# Patient Record
Sex: FEMALE | Race: WHITE | NOT HISPANIC OR LATINO | Employment: FULL TIME | URBAN - METROPOLITAN AREA
[De-identification: names, ages, dates, MRNs, and addresses within clinical notes are randomized per-mention and may not be internally consistent; named-entity substitution may affect disease eponyms.]

---

## 2023-03-31 PROBLEM — K90.0 CELIAC DISEASE (HHS-HCC): Status: ACTIVE | Noted: 2023-03-31

## 2023-03-31 PROBLEM — F41.9 ANXIETY: Status: ACTIVE | Noted: 2023-03-31

## 2023-03-31 PROBLEM — L70.9 ACNE: Status: ACTIVE | Noted: 2023-03-31

## 2023-03-31 PROBLEM — D64.9 ANEMIA: Status: ACTIVE | Noted: 2023-03-31

## 2023-03-31 PROBLEM — R53.83 OTHER FATIGUE: Status: ACTIVE | Noted: 2023-03-31

## 2023-03-31 PROBLEM — K90.41 GLUTEN-SENSITIVE ENTEROPATHY: Status: ACTIVE | Noted: 2023-03-31

## 2023-03-31 PROBLEM — E03.9 ACQUIRED HYPOTHYROIDISM: Status: ACTIVE | Noted: 2023-03-31

## 2023-03-31 PROBLEM — B37.31 VAGINAL YEAST INFECTION: Status: ACTIVE | Noted: 2023-03-31

## 2023-03-31 PROBLEM — R51.9 HEAD ACHE: Status: ACTIVE | Noted: 2023-03-31

## 2023-03-31 PROBLEM — L80 VITILIGO: Status: ACTIVE | Noted: 2023-03-31

## 2023-03-31 PROBLEM — R73.02 IGT (IMPAIRED GLUCOSE TOLERANCE): Status: ACTIVE | Noted: 2023-03-31

## 2023-03-31 PROBLEM — L40.9 PSORIASIS: Status: ACTIVE | Noted: 2023-03-31

## 2023-03-31 PROBLEM — M79.676 TOE PAIN: Status: ACTIVE | Noted: 2023-03-31

## 2023-03-31 RX ORDER — SERTRALINE HYDROCHLORIDE 50 MG/1
1 TABLET, FILM COATED ORAL DAILY
COMMUNITY
Start: 2021-11-11 | End: 2023-08-10 | Stop reason: SDUPTHER

## 2023-03-31 RX ORDER — THYROID 90 MG/1
TABLET ORAL
COMMUNITY
Start: 2022-06-09 | End: 2023-04-13

## 2023-03-31 RX ORDER — THYROID, PORCINE 15 MG/1
1 TABLET ORAL DAILY
COMMUNITY
Start: 2022-03-09 | End: 2023-04-13

## 2023-03-31 RX ORDER — SPIRONOLACTONE 25 MG/1
1 TABLET ORAL 2 TIMES DAILY
COMMUNITY
Start: 2020-06-19

## 2023-03-31 RX ORDER — FLUCONAZOLE 150 MG/1
TABLET ORAL
COMMUNITY
Start: 2020-10-01 | End: 2024-05-01 | Stop reason: SDUPTHER

## 2023-03-31 RX ORDER — THYROID 60 MG/1
1 TABLET ORAL DAILY
COMMUNITY
Start: 2019-08-02 | End: 2023-04-13

## 2023-04-03 ENCOUNTER — OFFICE VISIT (OUTPATIENT)
Dept: PRIMARY CARE | Facility: CLINIC | Age: 38
End: 2023-04-03
Payer: COMMERCIAL

## 2023-04-03 VITALS
OXYGEN SATURATION: 98 % | DIASTOLIC BLOOD PRESSURE: 68 MMHG | BODY MASS INDEX: 30.24 KG/M2 | SYSTOLIC BLOOD PRESSURE: 116 MMHG | HEART RATE: 77 BPM | WEIGHT: 216 LBS | HEIGHT: 71 IN | TEMPERATURE: 97.5 F

## 2023-04-03 DIAGNOSIS — E04.9 GOITER: ICD-10-CM

## 2023-04-03 DIAGNOSIS — E03.9 ACQUIRED HYPOTHYROIDISM: Primary | ICD-10-CM

## 2023-04-03 PROBLEM — B37.31 VAGINAL YEAST INFECTION: Status: RESOLVED | Noted: 2023-03-31 | Resolved: 2023-04-03

## 2023-04-03 PROBLEM — R51.9 HEAD ACHE: Status: RESOLVED | Noted: 2023-03-31 | Resolved: 2023-04-03

## 2023-04-03 PROBLEM — K90.41 GLUTEN-SENSITIVE ENTEROPATHY: Status: RESOLVED | Noted: 2023-03-31 | Resolved: 2023-04-03

## 2023-04-03 PROBLEM — M79.676 TOE PAIN: Status: RESOLVED | Noted: 2023-03-31 | Resolved: 2023-04-03

## 2023-04-03 PROCEDURE — 99213 OFFICE O/P EST LOW 20 MIN: CPT | Performed by: INTERNAL MEDICINE

## 2023-04-03 RX ORDER — MULTIVITAMIN
1 TABLET ORAL DAILY
COMMUNITY

## 2023-04-03 RX ORDER — CETIRIZINE HYDROCHLORIDE 10 MG/1
10 TABLET ORAL DAILY
COMMUNITY

## 2023-04-04 NOTE — PROGRESS NOTES
"Subjective   Patient ID: Kendal Heredia is a 37 y.o. female who presents for Follow-up.    HPI hypothyroidism. Would like labs due to fatigue and weight gain    Review of Systems    Objective   /68   Pulse 77   Temp 36.4 °C (97.5 °F)   Ht 1.803 m (5' 11\")   Wt 98 kg (216 lb)   SpO2 98%   BMI 30.13 kg/m²     Physical Exam  Gen nad, affect wnl  Heentt eomfg, face symmetric, ncat  Neck w/o la, tm, bruit  Lungs clear   Cv rrr nl s1, s2  Ext w/o edema  Neuro grossly nonfocal  Skin good color    Assessment/Plan   Diagnoses and all orders for this visit:  Acquired hypothyroidism  -     TSH; Future  -     T4, free; Future  -     T3; Future  Goiter  -     US thyroid; Future       "

## 2023-04-06 ENCOUNTER — LAB (OUTPATIENT)
Dept: LAB | Facility: LAB | Age: 38
End: 2023-04-06
Payer: COMMERCIAL

## 2023-04-06 DIAGNOSIS — E03.9 ACQUIRED HYPOTHYROIDISM: ICD-10-CM

## 2023-04-06 LAB
THYROTROPIN (MIU/L) IN SER/PLAS BY DETECTION LIMIT <= 0.05 MIU/L: 3.75 MIU/L (ref 0.44–3.98)
THYROXINE (T4) FREE (NG/DL) IN SER/PLAS: 0.74 NG/DL (ref 0.78–1.48)
TRIIODOTHYRONINE (T3) (NG/DL) IN SER/PLAS: 121 NG/DL (ref 60–200)

## 2023-04-06 PROCEDURE — 84443 ASSAY THYROID STIM HORMONE: CPT

## 2023-04-06 PROCEDURE — 84480 ASSAY TRIIODOTHYRONINE (T3): CPT

## 2023-04-06 PROCEDURE — 36415 COLL VENOUS BLD VENIPUNCTURE: CPT

## 2023-04-06 PROCEDURE — 84439 ASSAY OF FREE THYROXINE: CPT

## 2023-04-13 DIAGNOSIS — E03.9 ACQUIRED HYPOTHYROIDISM: Primary | ICD-10-CM

## 2023-04-13 RX ORDER — THYROID 90 MG/1
90 TABLET ORAL
Qty: 30 TABLET | Refills: 11 | Status: SHIPPED | OUTPATIENT
Start: 2023-04-13 | End: 2023-06-15 | Stop reason: SDUPTHER

## 2023-06-15 DIAGNOSIS — E03.9 ACQUIRED HYPOTHYROIDISM: ICD-10-CM

## 2023-06-15 RX ORDER — THYROID 90 MG/1
90 TABLET ORAL
Qty: 90 TABLET | Refills: 3 | Status: SHIPPED | OUTPATIENT
Start: 2023-06-15 | End: 2024-06-09

## 2023-08-10 DIAGNOSIS — F41.9 ANXIETY: Primary | ICD-10-CM

## 2023-08-10 RX ORDER — SERTRALINE HYDROCHLORIDE 50 MG/1
50 TABLET, FILM COATED ORAL DAILY
Qty: 90 TABLET | Refills: 3 | Status: SHIPPED | OUTPATIENT
Start: 2023-08-10

## 2023-09-08 ENCOUNTER — LAB (OUTPATIENT)
Dept: LAB | Facility: LAB | Age: 38
End: 2023-09-08
Payer: COMMERCIAL

## 2023-09-08 DIAGNOSIS — E03.9 ACQUIRED HYPOTHYROIDISM: ICD-10-CM

## 2023-09-08 LAB — THYROTROPIN (MIU/L) IN SER/PLAS BY DETECTION LIMIT <= 0.05 MIU/L: 1.03 MIU/L (ref 0.44–3.98)

## 2023-09-08 PROCEDURE — 84443 ASSAY THYROID STIM HORMONE: CPT

## 2023-09-08 PROCEDURE — 36415 COLL VENOUS BLD VENIPUNCTURE: CPT

## 2024-03-05 ENCOUNTER — OFFICE VISIT (OUTPATIENT)
Dept: GASTROENTEROLOGY | Facility: CLINIC | Age: 39
End: 2024-03-05
Payer: COMMERCIAL

## 2024-03-05 VITALS — HEIGHT: 72 IN | BODY MASS INDEX: 28.71 KG/M2 | WEIGHT: 212 LBS | HEART RATE: 89 BPM | OXYGEN SATURATION: 97 %

## 2024-03-05 DIAGNOSIS — K90.0 CELIAC DISEASE (HHS-HCC): ICD-10-CM

## 2024-03-05 DIAGNOSIS — R19.8 IRREGULAR BOWEL HABITS: ICD-10-CM

## 2024-03-05 DIAGNOSIS — R10.9 ABDOMINAL PAIN, UNSPECIFIED ABDOMINAL LOCATION: Primary | ICD-10-CM

## 2024-03-05 PROCEDURE — 99204 OFFICE O/P NEW MOD 45 MIN: CPT | Performed by: INTERNAL MEDICINE

## 2024-03-05 RX ORDER — RUXOLITINIB 15 MG/G
1 CREAM TOPICAL 2 TIMES DAILY
COMMUNITY
Start: 2023-11-30

## 2024-03-05 RX ORDER — OMEPRAZOLE 40 MG/1
CAPSULE, DELAYED RELEASE ORAL
Qty: 90 CAPSULE | Refills: 3 | Status: SHIPPED | OUTPATIENT
Start: 2024-03-05

## 2024-03-05 NOTE — PROGRESS NOTES
Subjective     History of Present Illness:     37 yo with hypothyroidism, IBS, celiac disease here for evaluation of epigastric discomfort, indigestion, lower abdominal discomfort.  Last seen in 2019 at that time with irregular bowel movements, diagnosed with celiac disease with elevated gliaden ab and bx showing villous atrophy.  Colonoscopy 7/2018 internal hemorrhoids, normal biopsies. Improvement in symptoms on gluten free diet.     Intestinal issues over the last 9 months.  For several months the last year, general discomfort, epigastric - pain indigestion, bloating.  Also with lower abdominal stabbing pains.Upper abd pain very consistent. During that time also diarrhea, urgency .  In Jan tried eliminating certain things in diet- eliminated dairy, carbonated foods. Nothing felt related.  No new medications other than topical.  Less of upper pain, but now more diarrhea - 10 days of diarrhea no matter what you eat. Then no diarrhea for 10 days. Then normal which ranges from loose to formed.  + nocturnal bowel movements, but no ot ofen.   Did have heartburn previously took otc prilosec at night.    No weight loss.      Egd 2018- gastritis, normal duodenum bx c/w celiac  Colonoscopy 2018- normal to Ti, random colon bipsies normal; internal hemorrhoids      Past Medical History  Past Medical History:   Diagnosis Date    Abdominal distension (gaseous) 06/05/2018    Abdominal bloating    Acute upper respiratory infection, unspecified 10/06/2015    Acute upper respiratory infection    Chronic rhinitis     Rhinitis    Gluten-sensitive enteropathy 03/31/2023    Insect bite (nonvenomous), left knee, initial encounter (CODE) 08/24/2017    Tick bite of knee, left, initial encounter    Other fecal abnormalities 05/07/2018    Guaiac + stool    Other gastritis without bleeding 07/20/2018    Erosive gastritis    Other specified disorders of nose and nasal sinuses 10/04/2019    Nasal pain    Personal history of other diseases of  the nervous system and sense organs     History of migraine    Personal history of other diseases of the respiratory system 02/24/2015    History of acute sinusitis    Personal history of other diseases of the respiratory system 10/06/2015    History of acute pharyngitis    Personal history of other diseases of the respiratory system 10/06/2015    History of acute bronchitis    Personal history of other specified conditions 06/28/2019    History of abdominal pain       Social History  Social History     Tobacco Use    Smoking status: Never   Substance Use Topics    Alcohol use: Yes     Comment: 1-2 DAYS PER WEEK    Drug use: Never       Family History  Family History   Problem Relation Name Age of Onset    Ovarian cancer Mother      Osteoporosis Mother      Hypertension Father          Allergies  Allergies   Allergen Reactions    Doxycycline Unknown    Oxycodone Unknown    Penicillins Unknown       Medications  Current Outpatient Medications   Medication Instructions    cetirizine (ZYRTEC) 10 mg, oral, Daily    fluconazole (Diflucan) 150 mg tablet oral    multivitamin tablet 1 tablet, oral, Daily    Opzelura 1.5 % cream cream 1 Application, Topical, 2 times daily    sertraline (ZOLOFT) 50 mg, oral, Daily    spironolactone (Aldactone) 25 mg tablet 1 tablet, oral, 2 times daily    thyroid (pork) (ARMOUR) 90 mg, oral, Daily before breakfast        Objective   Visit Vitals  Pulse 89      Physical Exam  Vitals reviewed.   Constitutional:       General: She is awake.   Cardiovascular:      Rate and Rhythm: Normal rate and regular rhythm.   Pulmonary:      Effort: Pulmonary effort is normal.      Breath sounds: Normal breath sounds.   Abdominal:      General: Bowel sounds are normal.      Palpations: Abdomen is soft.      Tenderness: There is no abdominal tenderness.   Neurological:      Mental Status: She is alert and oriented to person, place, and time.   Psychiatric:         Attention and Perception: Attention and  perception normal.         Behavior: Behavior normal.           Assessment/Plan         37 yo with hypothyroidism, IBS, celiac disease here for evaluation of epigastrid discomfort, indigestion, lower abdominal discomfort in the setting of irregular bowel habits that appear to be more constipation with overflow diarrhea.  Upper abdominal symptoms consistent with esophageal reflux, possibly erosive disease. Will check stool studies and fecal calprotectin for diarrhea and recommend KUB to evaluate stool burden .  Will start omeprazole 40mg daily. Endoscopic evaluation in 2018 as above.    Rec  Omeprazole 40 mg daily  Check stool studies, fecal calprotectin   Check KUB      Follow up after above      Courtney Gillespie MD

## 2024-04-05 ENCOUNTER — APPOINTMENT (OUTPATIENT)
Dept: GASTROENTEROLOGY | Facility: CLINIC | Age: 39
End: 2024-04-05
Payer: COMMERCIAL

## 2024-05-01 ENCOUNTER — HOSPITAL ENCOUNTER (OUTPATIENT)
Dept: RADIOLOGY | Facility: CLINIC | Age: 39
Discharge: HOME | End: 2024-05-01
Payer: COMMERCIAL

## 2024-05-01 ENCOUNTER — OFFICE VISIT (OUTPATIENT)
Dept: OBSTETRICS AND GYNECOLOGY | Facility: CLINIC | Age: 39
End: 2024-05-01
Payer: COMMERCIAL

## 2024-05-01 ENCOUNTER — LAB (OUTPATIENT)
Dept: LAB | Facility: LAB | Age: 39
End: 2024-05-01
Payer: COMMERCIAL

## 2024-05-01 VITALS
DIASTOLIC BLOOD PRESSURE: 72 MMHG | WEIGHT: 220.5 LBS | HEIGHT: 72 IN | BODY MASS INDEX: 29.87 KG/M2 | SYSTOLIC BLOOD PRESSURE: 122 MMHG

## 2024-05-01 DIAGNOSIS — K90.0 CELIAC DISEASE (HHS-HCC): ICD-10-CM

## 2024-05-01 DIAGNOSIS — Z12.31 VISIT FOR SCREENING MAMMOGRAM: ICD-10-CM

## 2024-05-01 DIAGNOSIS — R19.8 IRREGULAR BOWEL HABITS: ICD-10-CM

## 2024-05-01 DIAGNOSIS — R10.9 ABDOMINAL PAIN, UNSPECIFIED ABDOMINAL LOCATION: ICD-10-CM

## 2024-05-01 DIAGNOSIS — B37.9 YEAST INFECTION: Primary | ICD-10-CM

## 2024-05-01 DIAGNOSIS — N84.1 CERVICAL POLYP: ICD-10-CM

## 2024-05-01 PROCEDURE — 83516 IMMUNOASSAY NONANTIBODY: CPT

## 2024-05-01 PROCEDURE — 87329 GIARDIA AG IA: CPT

## 2024-05-01 PROCEDURE — 88305 TISSUE EXAM BY PATHOLOGIST: CPT | Performed by: PATHOLOGY

## 2024-05-01 PROCEDURE — 83993 ASSAY FOR CALPROTECTIN FECAL: CPT

## 2024-05-01 PROCEDURE — 74019 RADEX ABDOMEN 2 VIEWS: CPT | Performed by: RADIOLOGY

## 2024-05-01 PROCEDURE — 87506 IADNA-DNA/RNA PROBE TQ 6-11: CPT

## 2024-05-01 PROCEDURE — 57500 BIOPSY OF CERVIX: CPT | Performed by: OBSTETRICS & GYNECOLOGY

## 2024-05-01 PROCEDURE — 99396 PREV VISIT EST AGE 40-64: CPT | Performed by: OBSTETRICS & GYNECOLOGY

## 2024-05-01 PROCEDURE — 87328 CRYPTOSPORIDIUM AG IA: CPT

## 2024-05-01 PROCEDURE — 36415 COLL VENOUS BLD VENIPUNCTURE: CPT

## 2024-05-01 PROCEDURE — 88305 TISSUE EXAM BY PATHOLOGIST: CPT

## 2024-05-01 PROCEDURE — 74019 RADEX ABDOMEN 2 VIEWS: CPT

## 2024-05-01 RX ORDER — FLUCONAZOLE 150 MG/1
150 TABLET ORAL ONCE AS NEEDED
Qty: 4 TABLET | Refills: 2 | Status: SHIPPED | OUTPATIENT
Start: 2024-05-01

## 2024-05-01 NOTE — PROGRESS NOTES
Patient presents for an annual exam  Last PAP 9/15/2020 NEG HPV-  Last Mammogram diagnostic with ultrasound 4/05/2021 Benign

## 2024-05-01 NOTE — PROGRESS NOTES
Patient ID: Kendal Heredia is a 38 y.o. female.    Colposcopy    Date/Time: 5/1/2024 2:26 PM    Performed by: Peggy Griffiths MD  Authorized by: Peggy Griffiths MD    Procedure location: cervix    Consent:     Patient questions answered: yes      Risks and benefits of the procedure and its alternatives discussed: yes      Procedural risks discussed:  Bleeding    Consent obtained:  Verbal    Consent given by:  Patient  Universal Protocol:     A time out verifies correct patient, procedure, equipment, support staff, and site/side marked as required: yes      Patient states understanding of procedure being performed: yes    Indication:     Cervical indication(s) comment:  Cervical polyp seen on exam    Vaginal indication(s) comment:  Cervical polyp seen on exam    Vulvar indication(s) comment:  Cervical polyp seen on exam    Other indication(s): clinical abnormality      Other indication(s) comment:  Cervical polyp seen on exam  Procedure:     Cervix visibility: fully visualized      Lesion well visualized: large cervical polyp seen protruding from cervical os.    Post-procedure:     Patient tolerance of procedure:  Patient tolerated the procedure well with no immediate complications    Estimated blood loss (mL):  1  Comments:      Incidental finding of cervical polyp during annual exam.  Discussed removal and verbal consent obtained.  Polyp grasped with ringed forceps, twisted and easily removed from stalk.  Minimal bleeding noted after procedure.  Pt with minimal discomfort.  Will send to pathology.

## 2024-05-01 NOTE — PROGRESS NOTES
"GYN ANNUAL EXAM    SUBJECTIVE    Kendal Heredia is a 38 y.o. female who presents for annual exam today.  Her periods are monthly and fairly regular.  Some months they seem heavier and longer than her \"normal.\"  She is using condoms for contraception and is happy with this.  She has no GYN complaints today.      PMH - hypothyroidism, anxiety/depression, acne     PSH - laparoscopy -endometriosis     OB history -   OB History    Para Term  AB Living   0 0 0 0 0 0   SAB IAB Ectopic Multiple Live Births   0 0 0 0 0     Last pap -   Normal HPV Negative-      Last mammogram - had a mammogram around age 36     Family history of breast or ovarian cancer - maternal grandmother- breast cancer     OBJECTIVE  /72 (BP Location: Right arm, Patient Position: Sitting, BP Cuff Size: Adult)   Ht 1.829 m (6')   Wt 100 kg (220 lb 8 oz)   LMP 2024   Breastfeeding No   BMI 29.91 kg/m²     General Appearance   - consistent with stated age, well groomed and cooperative    Integumentary  - skin warm and dry without rash    Head and Neck  - normalocephalic and neck supple    Chest and Lung Exam  - normal breathing effort, no respiratory distress    Breast  - symmetry noted, no mass palpable, no skin change and no nipple discharge.    Abdomen  - soft, nontender and no hepatomegaly, splenomegaly, or mass    Female Genitourinary  - vulva normal without rash or lesion, normal vaginal rugae, no vaginal discharge, uterus normal size & no palpable masses, no adnexal mass, no adnexal tenderness, no cervical motion tenderness, large cervical polyp noted on exam - incidental finding - removed - see separate procedure note     Peripheral Vascular  - no edema present    ASSESSMENT/PLAN  38 y.o.  female who presents for annual exam.       Actions performed during this visit include:  - Clinical breast exam  - Clinical pelvic exam  - Pap- UTD and not indicated   - Mammogram- Breast cancer screening discussed " and screening mammogram ordered   - Contraception - uses condoms - happy with this   - STI screening  - Declines  - Cervical polyp - incidental finding on exam - removed  - see separate procedure note     Please return for your next visit in 1 year or sooner as needed.     Peggy Griffiths MD

## 2024-05-02 LAB
C COLI+JEJ+UPSA DNA STL QL NAA+PROBE: NOT DETECTED
EC STX1 GENE STL QL NAA+PROBE: NOT DETECTED
EC STX2 GENE STL QL NAA+PROBE: NOT DETECTED
GLIADIN PEPTIDE IGA SER IA-ACNC: 3.2 U/ML
NOROVIRUS GI + GII RNA STL NAA+PROBE: NOT DETECTED
RV RNA STL NAA+PROBE: NOT DETECTED
SALMONELLA DNA STL QL NAA+PROBE: NOT DETECTED
SHIGELLA DNA SPEC QL NAA+PROBE: NOT DETECTED
TTG IGA SER IA-ACNC: <1 U/ML
V CHOLERAE DNA STL QL NAA+PROBE: NOT DETECTED
Y ENTEROCOL DNA STL QL NAA+PROBE: NOT DETECTED

## 2024-05-03 LAB
CALPROTECTIN STL-MCNT: 68 UG/G
GLIADIN PEPTIDE IGG SER IA-ACNC: 2.42 FLU (ref 0–4.99)
TTG IGG SER IA-ACNC: <0.82 FLU (ref 0–4.99)

## 2024-05-04 LAB
CRYPTOSP AG STL QL IA: NEGATIVE
G LAMBLIA AG STL QL IA: NEGATIVE

## 2024-05-05 LAB — O+P STL MICRO: NEGATIVE

## 2024-05-09 LAB
LAB AP ASR DISCLAIMER: NORMAL
LABORATORY COMMENT REPORT: NORMAL
PATH REPORT.FINAL DX SPEC: NORMAL
PATH REPORT.GROSS SPEC: NORMAL
PATH REPORT.RELEVANT HX SPEC: NORMAL
PATH REPORT.TOTAL CANCER: NORMAL

## 2024-05-24 ENCOUNTER — APPOINTMENT (OUTPATIENT)
Dept: GASTROENTEROLOGY | Facility: CLINIC | Age: 39
End: 2024-05-24
Payer: COMMERCIAL

## 2024-06-15 DIAGNOSIS — E03.9 ACQUIRED HYPOTHYROIDISM: ICD-10-CM

## 2024-06-17 RX ORDER — THYROID, PORCINE 90 MG/1
TABLET ORAL
Qty: 90 TABLET | Refills: 3 | Status: SHIPPED | OUTPATIENT
Start: 2024-06-17

## 2024-06-20 DIAGNOSIS — K90.0 CELIAC DISEASE (HHS-HCC): ICD-10-CM

## 2024-06-20 DIAGNOSIS — R19.8 IRREGULAR BOWEL HABITS: ICD-10-CM

## 2024-06-20 DIAGNOSIS — R10.9 ABDOMINAL PAIN, UNSPECIFIED ABDOMINAL LOCATION: ICD-10-CM

## 2024-07-24 ENCOUNTER — APPOINTMENT (OUTPATIENT)
Dept: GASTROENTEROLOGY | Facility: CLINIC | Age: 39
End: 2024-07-24
Payer: COMMERCIAL

## 2024-07-24 VITALS — OXYGEN SATURATION: 98 % | HEIGHT: 72 IN | BODY MASS INDEX: 32.05 KG/M2 | WEIGHT: 236.6 LBS | HEART RATE: 101 BPM

## 2024-07-24 DIAGNOSIS — R10.9 ABDOMINAL PAIN, UNSPECIFIED ABDOMINAL LOCATION: ICD-10-CM

## 2024-07-24 DIAGNOSIS — R19.8 IRREGULAR BOWEL HABITS: ICD-10-CM

## 2024-07-24 DIAGNOSIS — K90.0 CELIAC DISEASE (HHS-HCC): Primary | ICD-10-CM

## 2024-07-24 PROCEDURE — 3008F BODY MASS INDEX DOCD: CPT | Performed by: INTERNAL MEDICINE

## 2024-07-24 PROCEDURE — 99214 OFFICE O/P EST MOD 30 MIN: CPT | Performed by: INTERNAL MEDICINE

## 2024-07-24 RX ORDER — AZELAIC ACID 0.15 G/G
GEL TOPICAL
COMMUNITY

## 2024-07-24 NOTE — PROGRESS NOTES
Subjective     History of Present Illness:     37 yo with hypothyroidism, IBS, celiac disease diagnosed 2019, h/o endometriosishere for follow up of epigastric discomfort, indigestion, lower abdominal discomfort.  Last seen 3/2024 with 0 mo epigatsric discomfort, indigestion, bloating, lower abd pain, diarrhea, urgency. No dietary relations and no new meds.  Alternating diarrhea x 10 days then no diarrhea, then normal range from loose to formed.  Celiac panel wnl, stool studies negative . Fecal calpro 68.   KUB with right sided stool buildup recommended to start miralax.   Most days diarrhea, but not as urgent as before .   Has been taking metamucil nightly.  Bms are stil very irregular.  Abdominal discomfort daily  Did not receive messages on my chart.    2019-diagnosed with celiac disease with elevated gliaden ab and bx showing villous atrophy.  Colonoscopy 7/2018 internal hemorrhoids, normal biopsies. Improvement in symptoms on gluten free diet.     Notes that when she has her period these symptoms subside.    No weight loss.      Egd 2018- gastritis, normal duodenum bx c/w celiac  Colonoscopy 2018- normal to Ti, random colon bipsies normal; internal hemorrhoids    Allergies  Allergies   Allergen Reactions    Doxycycline Unknown    Oxycodone Unknown    Penicillins Unknown       Medications  Current Outpatient Medications   Medication Instructions    [place] Thyroid 90 mg tablet TAKE 1 TABLET (90 MG) BY MOUTH ONCE DAILY IN THE MORNING. TAKE BEFORE MEALS.    azelaic acid (Finacea) 15 % gel Azelaic Acid GEL Quantity: 0 Refills: 0 Ordered: 15-Sep-2020 DO Active    cetirizine (ZYRTEC) 10 mg, oral, Daily    fluconazole (DIFLUCAN) 150 mg, oral, Once as needed    multivitamin tablet 1 tablet, oral, Daily    omeprazole (PriLOSEC) 40 mg DR capsule Take one cap daily 30 min-1 hr before dinner. Do not crush or chew.    Opzelura 1.5 % cream cream 1 Application, Topical, 2 times daily    sertraline (ZOLOFT) 50 mg, oral, Daily     spironolactone (Aldactone) 25 mg tablet 1 tablet, oral, 2 times daily        Objective   There were no vitals taken for this visit.   Physical Exam  Vitals reviewed.   Constitutional:       General: She is awake.   Cardiovascular:      Rate and Rhythm: Normal rate and regular rhythm.   Pulmonary:      Effort: Pulmonary effort is normal.      Breath sounds: Normal breath sounds.   Abdominal:      General: Bowel sounds are normal.      Palpations: Abdomen is soft.      Tenderness: There is no abdominal tenderness.   Neurological:      Mental Status: She is alert and oriented to person, place, and time.   Psychiatric:         Attention and Perception: Attention and perception normal.         Behavior: Behavior normal.               Assessment/Plan:    37 yo with hypothyroidism, IBS, celiac disease here for follow up of irregular bowel habits, abd pain.  Stool studies negative, fecal calpro borderline at 69, celiac labs wnl.  KUB with stool burden on right side. Recommend starting miralax daily. If no improvement will tx for IBS-d with rifaximin.       Rec  Repeat fecal calprotectin  Start miralax daily  Continue psyllium daily  Call with update in 2 weeks    Follow up in 3 mo          Courtnye Gillespie MD

## 2024-09-06 ENCOUNTER — HOSPITAL ENCOUNTER (OUTPATIENT)
Dept: RADIOLOGY | Facility: CLINIC | Age: 39
Discharge: HOME | End: 2024-09-06
Payer: COMMERCIAL

## 2024-09-06 VITALS — BODY MASS INDEX: 31.97 KG/M2 | HEIGHT: 72 IN | WEIGHT: 236 LBS

## 2024-09-06 DIAGNOSIS — Z12.31 VISIT FOR SCREENING MAMMOGRAM: ICD-10-CM

## 2024-09-06 PROCEDURE — 77067 SCR MAMMO BI INCL CAD: CPT

## 2024-09-13 DIAGNOSIS — K58.0 IRRITABLE BOWEL SYNDROME WITH DIARRHEA: Primary | ICD-10-CM

## 2024-09-29 DIAGNOSIS — F41.9 ANXIETY: ICD-10-CM

## 2024-09-30 RX ORDER — SERTRALINE HYDROCHLORIDE 50 MG/1
50 TABLET, FILM COATED ORAL DAILY
Qty: 85 TABLET | Refills: 3 | Status: SHIPPED | OUTPATIENT
Start: 2024-09-30

## 2024-10-23 ENCOUNTER — APPOINTMENT (OUTPATIENT)
Dept: GASTROENTEROLOGY | Facility: CLINIC | Age: 39
End: 2024-10-23
Payer: COMMERCIAL

## 2025-02-21 ENCOUNTER — APPOINTMENT (OUTPATIENT)
Dept: GASTROENTEROLOGY | Facility: CLINIC | Age: 40
End: 2025-02-21
Payer: COMMERCIAL

## 2025-02-21 VITALS — HEART RATE: 70 BPM | BODY MASS INDEX: 29.53 KG/M2 | OXYGEN SATURATION: 97 % | HEIGHT: 72 IN | WEIGHT: 218 LBS

## 2025-02-21 DIAGNOSIS — K90.0 CELIAC DISEASE (HHS-HCC): Primary | ICD-10-CM

## 2025-02-21 DIAGNOSIS — R19.8 IRREGULAR BOWEL HABITS: ICD-10-CM

## 2025-02-21 DIAGNOSIS — K58.0 IRRITABLE BOWEL SYNDROME WITH DIARRHEA: ICD-10-CM

## 2025-02-21 PROCEDURE — 3008F BODY MASS INDEX DOCD: CPT | Performed by: INTERNAL MEDICINE

## 2025-02-21 PROCEDURE — 99214 OFFICE O/P EST MOD 30 MIN: CPT | Performed by: INTERNAL MEDICINE

## 2025-02-21 RX ORDER — IMIPRAMINE HYDROCHLORIDE 25 MG/1
25 TABLET, FILM COATED ORAL NIGHTLY
Qty: 30 TABLET | Refills: 11 | Status: SHIPPED | OUTPATIENT
Start: 2025-02-21 | End: 2026-02-21

## 2025-02-21 NOTE — PROGRESS NOTES
Subjective     History of Present Illness:     38 yo with hypothyroidism, IBS, celiac disease diagnosed 2019, h/o endometriosis here for follow up of epigastric discomfort, indigestion, lower abdominal discomfort.    Last seen 6 mo ago with irregular bowel movements, alternating diarrhea/constipation. KUB stool on right side; celiac and stool studies negative, fecal calprotectin neg.   Started psyllium with persistent irregularity, added miralax still abd pain, loose stools  Given 2 weeks of xifaxan no change in sx. Reports episodes of abdominal discomfort and loose stools appear a little less often, but still occurring. Between episodes seems to be doing fair. No blood per rectum. Continues on gluten free diet.  Fecal calprotectin 5/1/24 68, celiac panel negative.         2018-diagnosed with celiac disease with elevated gliaden ab and bx showing villous atrophy.  Colonoscopy 7/2018 internal hemorrhoids, normal biopsies. Improvement in symptoms on gluten free diet.     Notes that when she has her period these symptoms subside.    No weight loss.      Egd 2018- gastritis, normal duodenum bx c/w celiac  Colonoscopy 2018- normal to Ti, random colon bipsies normal; internal hemorrhoids    Allergies  Allergies   Allergen Reactions    Doxycycline Unknown    Oxycodone Unknown    Penicillins Unknown       Medications  Current Outpatient Medications   Medication Instructions    [place] Thyroid 90 mg tablet TAKE 1 TABLET (90 MG) BY MOUTH ONCE DAILY IN THE MORNING. TAKE BEFORE MEALS.    azelaic acid (Finacea) 15 % gel Azelaic Acid GEL Quantity: 0 Refills: 0 Ordered: 15-Sep-2020 DO Active    cetirizine (ZYRTEC) 10 mg, oral, Daily    fluconazole (DIFLUCAN) 150 mg, oral, Once as needed    multivitamin tablet 1 tablet, oral, Daily    omeprazole (PriLOSEC) 40 mg DR capsule Take one cap daily 30 min-1 hr before dinner. Do not crush or chew.    Opzelura 1.5 % cream cream 1 Application, Topical, 2 times daily    sertraline (ZOLOFT) 50  mg, oral, Daily    spironolactone (Aldactone) 25 mg tablet 1 tablet, oral, 2 times daily        Objective   There were no vitals taken for this visit.   Physical Exam  Cardiovascular:      Rate and Rhythm: Normal rate and regular rhythm.   Pulmonary:      Effort: Pulmonary effort is normal. No respiratory distress.      Breath sounds: Normal breath sounds. No wheezing.   Abdominal:      General: Bowel sounds are normal. There is no distension.      Palpations: Abdomen is soft. There is no mass.      Tenderness: There is no abdominal tenderness.   Neurological:      Mental Status: She is alert.               Assessment/Plan:    40 yo with hypothyroidism, IBS, celiac disease here for follow up of irregular bowel habits, abd pain.  Stool studies negative, fecal calpro borderline at 68, celiac labs wnl.  KUB with stool burden on right side. No change with rifaximin for IBS-d.  Recommend checking hydrogen breath test, will repeat fecal calprotectin. Will start TCA for IBS. May need repeat colonoscopy to bx for microscopic colitis.     Rec  Start imipramine 25mg at bedtime  Check celiac panel, tsh, hydrogen breath test  Repeat fecal calprotectin  Continue psyllium daily    Follow up in 3 mo      Courtney Gillespie MD

## 2025-05-16 DIAGNOSIS — R10.9 ABDOMINAL PAIN, UNSPECIFIED ABDOMINAL LOCATION: ICD-10-CM

## 2025-05-16 DIAGNOSIS — K90.0 CELIAC DISEASE (HHS-HCC): Primary | ICD-10-CM

## 2025-05-16 DIAGNOSIS — R19.8 IRREGULAR BOWEL HABITS: ICD-10-CM

## 2025-05-17 LAB
GLIADIN IGA SER IA-ACNC: NORMAL
GLIADIN IGG SER IA-ACNC: NORMAL
IGA SERPL-MCNC: NORMAL MG/DL
IGA SERPL-MCNC: NORMAL MG/DL
IGG SERPL-MCNC: NORMAL MG/DL
IGM SERPL-MCNC: NORMAL MG/DL
TSH SERPL-ACNC: 1.26 MIU/L
TTG IGA SER-ACNC: NORMAL
TTG IGG SER-ACNC: NORMAL

## 2025-05-20 LAB
GLIADIN IGA SER IA-ACNC: 3.8 U/ML
GLIADIN IGG SER IA-ACNC: <1 U/ML
IGA SERPL-MCNC: 174 MG/DL (ref 47–310)
IGA SERPL-MCNC: 174 MG/DL (ref 47–310)
IGG SERPL-MCNC: 1318 MG/DL (ref 600–1640)
IGM SERPL-MCNC: 153 MG/DL (ref 50–300)
TSH SERPL-ACNC: 1.26 MIU/L
TTG IGA SER-ACNC: <1 U/ML
TTG IGG SER-ACNC: <1 U/ML

## 2025-05-27 ENCOUNTER — APPOINTMENT (OUTPATIENT)
Dept: GASTROENTEROLOGY | Facility: CLINIC | Age: 40
End: 2025-05-27
Payer: COMMERCIAL

## 2025-05-27 VITALS — BODY MASS INDEX: 29.66 KG/M2 | WEIGHT: 219 LBS | HEIGHT: 72 IN | HEART RATE: 95 BPM

## 2025-05-27 DIAGNOSIS — K90.0 CELIAC DISEASE (HHS-HCC): ICD-10-CM

## 2025-05-27 DIAGNOSIS — K58.0 IRRITABLE BOWEL SYNDROME WITH DIARRHEA: Primary | ICD-10-CM

## 2025-05-27 PROCEDURE — 3008F BODY MASS INDEX DOCD: CPT | Performed by: INTERNAL MEDICINE

## 2025-05-27 PROCEDURE — 99214 OFFICE O/P EST MOD 30 MIN: CPT | Performed by: INTERNAL MEDICINE

## 2025-05-27 NOTE — PROGRESS NOTES
Subjective     History of Present Illness:     38 yo with hypothyroidism, IBS, celiac disease diagnosed 2019, h/o endometriosis here for follow up of epigastric discomfort, indigestion, lower abdominal discomfort.    Last seen 3 mo ago with persistent intermittent loose stools, abd pain. No improvement with psyllium fiber, xifaxan x 2 weeks. On GFD, celiac panel WNL, fecal judd 5/1/2024 68.  Recommended repeat fecal calprotectin,celiac panel,  starting imipramine 25 mg at bedtime, check hydrogen breath test .  5/16/26 celiac panel,TSH, immunoglobulin levels wnl. Fecal calprotectin pending. She has to schedule hydrogen breath test.  Since last seen feeling much improved, no longer abdominal pain or bloating. Since last seen 3 mo ago only one week of loose stools and urgency, short lived.  Bms every other day. No loose stools.  NO early satiety or nausea.  Feeling much improved overall. Continues on imipramine 25mg at bedtime.   Less regular, which improves on fiber supplement bid which she forget second dose at times.       2018-diagnosed with celiac disease with elevated gliaden ab and bx showing villous atrophy.  Colonoscopy 7/2018 internal hemorrhoids, normal biopsies. Improvement in symptoms on gluten free diet.       Egd 2018- gastritis, normal duodenum bx c/w celiac  Colonoscopy 2018- normal to Ti, random colon bipsies normal; internal hemorrhoids    Allergies  RX Allergies[1]    Medications  Current Outpatient Medications   Medication Instructions    [place] Thyroid 90 mg tablet TAKE 1 TABLET (90 MG) BY MOUTH ONCE DAILY IN THE MORNING. TAKE BEFORE MEALS.    cetirizine (ZYRTEC) 10 mg, oral, Daily    fluconazole (DIFLUCAN) 150 mg, oral, Once as needed    imipramine (TOFRANIL) 25 mg, oral, Nightly    multivitamin tablet 1 tablet, oral, Daily    omeprazole (PriLOSEC) 40 mg DR capsule Take one cap daily 30 min-1 hr before dinner. Do not crush or chew.    Opzelura 1.5 % cream cream 1 Application, Topical, 2 times  daily    sertraline (ZOLOFT) 50 mg, oral, Daily    spironolactone (Aldactone) 25 mg tablet 1 tablet, oral, 2 times daily        Objective   There were no vitals taken for this visit.   Physical Exam  Cardiovascular:      Rate and Rhythm: Normal rate and regular rhythm.   Pulmonary:      Effort: Pulmonary effort is normal. No respiratory distress.      Breath sounds: Normal breath sounds. No wheezing.   Abdominal:      General: Bowel sounds are normal. There is no distension.      Palpations: Abdomen is soft. There is no mass.      Tenderness: There is no abdominal tenderness.   Neurological:      Mental Status: She is alert.               Assessment/Plan:    40 yo with hypothyroidism, IBS, celiac disease here for follow up of irregular bowel habits, abd pain 2/2 IBS-d with improvement on imipramine 25mg.  Stool studies negative, fecal calpro borderline at 68, celiac labs, TSH, immunoglobulin levels wnl.  KUB with stool burden on right side, on fiber and tried miralax which caused increased loose stools.. No change with rifaximin for IBS-d.    Rec  cont imipramine 25mg at bedtime  Check hydrogen breath test  Repeat fecal calprotectin  Continue psyllium daily, may try miralax if needed.    Follow up 6 mo, sooner as needed    Courtney Gillespie MD              [1]   Allergies  Allergen Reactions    Doxycycline Unknown    Oxycodone Unknown    Penicillins Unknown

## 2025-06-03 DIAGNOSIS — E03.9 ACQUIRED HYPOTHYROIDISM: ICD-10-CM

## 2025-06-04 RX ORDER — THYROID, PORCINE 90 MG/1
TABLET ORAL
Qty: 90 TABLET | Refills: 3 | Status: SHIPPED | OUTPATIENT
Start: 2025-06-04

## 2025-06-24 DIAGNOSIS — B37.9 YEAST INFECTION: ICD-10-CM

## 2025-06-24 RX ORDER — FLUCONAZOLE 150 MG/1
150 TABLET ORAL ONCE AS NEEDED
Qty: 4 TABLET | Refills: 2 | Status: SHIPPED | OUTPATIENT
Start: 2025-06-24

## 2025-06-26 ENCOUNTER — APPOINTMENT (OUTPATIENT)
Dept: PRIMARY CARE | Facility: CLINIC | Age: 40
End: 2025-06-26
Payer: COMMERCIAL

## 2025-06-26 VITALS
HEART RATE: 100 BPM | OXYGEN SATURATION: 97 % | WEIGHT: 206 LBS | HEIGHT: 72 IN | DIASTOLIC BLOOD PRESSURE: 86 MMHG | BODY MASS INDEX: 27.9 KG/M2 | SYSTOLIC BLOOD PRESSURE: 130 MMHG

## 2025-06-26 DIAGNOSIS — K90.0 CELIAC DISEASE (HHS-HCC): ICD-10-CM

## 2025-06-26 DIAGNOSIS — Z00.00 WELLNESS EXAMINATION: ICD-10-CM

## 2025-06-26 DIAGNOSIS — E03.9 ACQUIRED HYPOTHYROIDISM: ICD-10-CM

## 2025-06-26 DIAGNOSIS — R73.02 IGT (IMPAIRED GLUCOSE TOLERANCE): Primary | ICD-10-CM

## 2025-06-26 PROCEDURE — 99213 OFFICE O/P EST LOW 20 MIN: CPT | Performed by: INTERNAL MEDICINE

## 2025-06-26 PROCEDURE — 3008F BODY MASS INDEX DOCD: CPT | Performed by: INTERNAL MEDICINE

## 2025-06-26 NOTE — PROGRESS NOTES
Subjective   Patient ID: Kendal Heredia is a 39 y.o. female who presents for Med Refill.    HPI fu rhinitis, dep/anx, hypothyroidism  A lot of stress, anxiety, some sadness  Otherwise well  Seeing counselor    Review of Systems  As above    Objective   /86   Pulse 100   Ht 1.829 m (6')   Wt 93.4 kg (206 lb)   SpO2 97%   BMI 27.94 kg/m²     Physical Exam  Gen nad, affect sad at times but consolable  [place]entt eomfg, face symmetric, ncat  Neck w/o la, tm, bruit  Lungs clear   Cv rrr nl s1, s2  Ext w/o edema  Neuro grossly nonfocal  Skin good color    Assessment/Plan   Diagnoses and all orders for this visit:  IGT (impaired glucose tolerance)  -     Hemoglobin A1C; Future  Acquired hypothyroidism  Celiac disease ([facility])  Wellness examination  -     Comprehensive metabolic panel; Future  -     CBC and Auto Differential; Future  -     Lipid Panel; Future     Fu counselor, gyn  Fu 6 mos

## 2025-07-14 DIAGNOSIS — F41.1 GAD (GENERALIZED ANXIETY DISORDER): Primary | ICD-10-CM

## 2025-07-14 RX ORDER — LORAZEPAM 0.5 MG/1
0.5 TABLET ORAL 3 TIMES DAILY PRN
Qty: 30 TABLET | Refills: 0 | Status: SHIPPED | OUTPATIENT
Start: 2025-07-14 | End: 2025-10-12

## 2025-10-14 ENCOUNTER — APPOINTMENT (OUTPATIENT)
Dept: GASTROENTEROLOGY | Facility: CLINIC | Age: 40
End: 2025-10-14
Payer: COMMERCIAL